# Patient Record
Sex: FEMALE | Race: WHITE
[De-identification: names, ages, dates, MRNs, and addresses within clinical notes are randomized per-mention and may not be internally consistent; named-entity substitution may affect disease eponyms.]

---

## 2017-10-26 ENCOUNTER — HOSPITAL ENCOUNTER (OUTPATIENT)
Dept: HOSPITAL 74 - FMAMMOTONE | Age: 77
Discharge: HOME | End: 2017-10-26
Attending: SURGERY
Payer: COMMERCIAL

## 2017-10-26 DIAGNOSIS — N64.89: ICD-10-CM

## 2017-10-26 DIAGNOSIS — N60.81: ICD-10-CM

## 2017-10-26 DIAGNOSIS — D24.1: Primary | ICD-10-CM

## 2017-10-26 DIAGNOSIS — R92.1: ICD-10-CM

## 2017-10-26 PROCEDURE — 0HBT3ZX EXCISION OF RIGHT BREAST, PERCUTANEOUS APPROACH, DIAGNOSTIC: ICD-10-PCS

## 2017-10-26 PROCEDURE — A4648 IMPLANTABLE TISSUE MARKER: HCPCS

## 2017-10-30 NOTE — PATH
Surgical Pathology Report



Patient Name:  SUSAN PRESCOTT

Accession #:  [id number]

Med. Rec. #:  [id number]                                                        

   /Age/Gender:  1940 (Age: 77) / F

Account:  [id number]                                                          

             Location: [facility]

Taken:  10/26/2017

Received:  10/26/2017

Reported:  10/30/2017

Physicians:  BRIGETTE Swain M.D.

  



Specimen(s) Received

A: RIGHT BREAST SPECIMEN WITH CALCIFICATIONS 

B: RIGHT BREAST SPECIMEN WITHOUT CALCIFICATIONS 





Clinical History

Mammographic findings: Microcalcification, suspicious







Final Diagnosis

A. BREAST, RIGHT, WITH CALCIFICATIONS, STEREOTACTIC BIOPSY:  

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

FOCAL MICROCYST FORMATION AND USUAL DUCTAL HYPERPLASIA (UDH).



B. BREAST, RIGHT, STEREOTACTIC BIOPSY:  

BENIGN BREAST TISSUE.





***Electronically Signed***

Jeanette Harden M.D.





Gross Description

A. Received in formalin labeled "right breast with calcifications," is a 2.2 x

2.2 x 0.3 cm aggregate of multiple tan-yellow, irregular to cylindrical portions

of fibroadipose tissue. The formalin is filtered and the specimen is entirely

submitted in one cassette.



B. Received in formalin labeled "right breast without calcifications," is a 2.2

x 1.8 x 0.3 cm aggregate of multiple tan-yellow, irregular to cylindrical

portions of fibroadipose tissue. The formalin is filtered and the specimen is

entirely submitted in one cassette.

Time to formalin fixation: 5 minutes

Total formalin fixation time: Approximately 6 hours.

/10/26/2017



saudi/10/26/2017

## 2021-08-19 ENCOUNTER — APPOINTMENT (OUTPATIENT)
Dept: CT IMAGING | Facility: CLINIC | Age: 81
End: 2021-08-19
Payer: MEDICARE

## 2021-08-19 PROCEDURE — 82565 ASSAY OF CREATININE: CPT | Mod: QW

## 2021-08-19 PROCEDURE — 74177 CT ABD & PELVIS W/CONTRAST: CPT | Mod: MH

## 2022-04-26 ENCOUNTER — APPOINTMENT (OUTPATIENT)
Dept: CT IMAGING | Facility: CLINIC | Age: 82
End: 2022-04-26
Payer: MEDICARE

## 2022-04-26 PROCEDURE — 73700 CT LOWER EXTREMITY W/O DYE: CPT | Mod: LT,MH

## 2022-05-04 ENCOUNTER — APPOINTMENT (OUTPATIENT)
Dept: CT IMAGING | Facility: CLINIC | Age: 82
End: 2022-05-04
Payer: MEDICARE

## 2022-05-04 PROCEDURE — 70450 CT HEAD/BRAIN W/O DYE: CPT | Mod: MH

## 2022-05-04 PROCEDURE — 71250 CT THORAX DX C-: CPT | Mod: MH

## 2022-06-08 ENCOUNTER — APPOINTMENT (OUTPATIENT)
Dept: ULTRASOUND IMAGING | Facility: CLINIC | Age: 82
End: 2022-06-08
Payer: MEDICARE

## 2022-06-08 PROCEDURE — 93970 EXTREMITY STUDY: CPT

## 2022-06-15 ENCOUNTER — APPOINTMENT (OUTPATIENT)
Dept: CT IMAGING | Facility: CLINIC | Age: 82
End: 2022-06-15

## 2022-06-15 PROCEDURE — 74177 CT ABD & PELVIS W/CONTRAST: CPT | Mod: MH

## 2022-08-03 ENCOUNTER — APPOINTMENT (OUTPATIENT)
Dept: CT IMAGING | Facility: CLINIC | Age: 82
End: 2022-08-03

## 2022-08-03 PROCEDURE — 71250 CT THORAX DX C-: CPT | Mod: MH

## 2023-04-10 ENCOUNTER — APPOINTMENT (OUTPATIENT)
Dept: CT IMAGING | Facility: CLINIC | Age: 83
End: 2023-04-10
Payer: MEDICARE

## 2023-04-10 PROCEDURE — 71250 CT THORAX DX C-: CPT | Mod: MH

## 2023-04-11 ENCOUNTER — APPOINTMENT (OUTPATIENT)
Dept: MRI IMAGING | Facility: CLINIC | Age: 83
End: 2023-04-11
Payer: MEDICARE

## 2023-04-11 PROCEDURE — 72141 MRI NECK SPINE W/O DYE: CPT | Mod: MH

## 2023-04-11 PROCEDURE — 72148 MRI LUMBAR SPINE W/O DYE: CPT | Mod: MH

## 2023-04-20 ENCOUNTER — APPOINTMENT (OUTPATIENT)
Dept: ULTRASOUND IMAGING | Facility: CLINIC | Age: 83
End: 2023-04-20
Payer: MEDICARE

## 2023-04-20 PROCEDURE — 76700 US EXAM ABDOM COMPLETE: CPT

## 2023-08-09 ENCOUNTER — APPOINTMENT (OUTPATIENT)
Dept: ULTRASOUND IMAGING | Facility: CLINIC | Age: 83
End: 2023-08-09

## 2023-10-20 ENCOUNTER — APPOINTMENT (OUTPATIENT)
Dept: ULTRASOUND IMAGING | Facility: CLINIC | Age: 83
End: 2023-10-20
Payer: MEDICARE

## 2023-10-20 PROCEDURE — 76770 US EXAM ABDO BACK WALL COMP: CPT

## 2023-11-22 ENCOUNTER — APPOINTMENT (OUTPATIENT)
Dept: MRI IMAGING | Facility: CLINIC | Age: 83
End: 2023-11-22
Payer: MEDICARE

## 2023-11-22 PROCEDURE — 72148 MRI LUMBAR SPINE W/O DYE: CPT | Mod: MH

## 2023-12-28 ENCOUNTER — APPOINTMENT (OUTPATIENT)
Dept: RADIOLOGY | Facility: CLINIC | Age: 83
End: 2023-12-28
Payer: MEDICARE

## 2023-12-28 PROCEDURE — 73630 X-RAY EXAM OF FOOT: CPT | Mod: LT
